# Patient Record
Sex: MALE | Race: WHITE | Employment: FULL TIME | URBAN - METROPOLITAN AREA
[De-identification: names, ages, dates, MRNs, and addresses within clinical notes are randomized per-mention and may not be internally consistent; named-entity substitution may affect disease eponyms.]

---

## 2017-03-14 ENCOUNTER — HOSPITAL ENCOUNTER (OUTPATIENT)
Dept: MAMMOGRAPHY | Age: 32
Discharge: HOME | End: 2017-03-14
Attending: NURSE PRACTITIONER
Payer: COMMERCIAL

## 2017-03-14 DIAGNOSIS — N63.0 BREAST LUMP: ICD-10-CM

## 2017-03-14 DIAGNOSIS — N64.4 BREAST PAIN: ICD-10-CM

## 2017-03-14 PROCEDURE — 76642 ULTRASOUND BREAST LIMITED: CPT

## 2017-03-14 PROCEDURE — 77066 DX MAMMO INCL CAD BI: CPT

## 2017-03-23 PROBLEM — G89.29 CHRONIC HIP PAIN: Status: ACTIVE | Noted: 2017-03-23

## 2017-03-23 PROBLEM — F32.A DEPRESSION: Status: ACTIVE | Noted: 2017-03-23

## 2017-03-23 PROBLEM — M25.559 CHRONIC HIP PAIN: Status: ACTIVE | Noted: 2017-03-23

## 2017-04-10 ENCOUNTER — HOSPITAL ENCOUNTER (OUTPATIENT)
Dept: SLEEP MEDICINE | Age: 32
Discharge: HOME | End: 2017-04-10
Payer: COMMERCIAL

## 2017-04-10 PROCEDURE — 95810 POLYSOM 6/> YRS 4/> PARAM: CPT

## 2017-05-23 PROBLEM — G47.10 HYPERSOMNOLENCE: Status: ACTIVE | Noted: 2017-05-23

## 2017-11-14 PROBLEM — J30.2 CHRONIC SEASONAL ALLERGIC RHINITIS: Status: ACTIVE | Noted: 2017-11-14

## 2017-12-21 ENCOUNTER — HOSPITAL ENCOUNTER (OUTPATIENT)
Dept: LAB | Age: 32
Discharge: HOME | End: 2017-12-21

## 2017-12-21 PROCEDURE — 88305 TISSUE EXAM BY PATHOLOGIST: CPT | Performed by: OTOLARYNGOLOGY

## 2017-12-27 PROBLEM — F33.9 RECURRENT DEPRESSION (HCC): Status: ACTIVE | Noted: 2017-12-27

## 2018-08-03 PROBLEM — E78.2 MIXED HYPERLIPIDEMIA: Status: ACTIVE | Noted: 2018-08-03

## 2018-08-03 PROBLEM — F33.9 RECURRENT DEPRESSION (HCC): Status: RESOLVED | Noted: 2017-12-27 | Resolved: 2018-08-03

## 2018-08-03 PROBLEM — M25.551 CHRONIC PAIN OF RIGHT HIP: Status: ACTIVE | Noted: 2018-08-03

## 2018-08-03 PROBLEM — G47.10 HYPERSOMNOLENCE: Status: ACTIVE | Noted: 2018-08-03

## 2018-08-03 PROBLEM — F32.A CHRONIC DEPRESSION: Status: ACTIVE | Noted: 2018-08-03

## 2018-08-03 PROBLEM — F32.A DEPRESSION: Status: RESOLVED | Noted: 2017-03-23 | Resolved: 2018-08-03

## 2018-08-03 PROBLEM — G89.29 CHRONIC HIP PAIN: Status: RESOLVED | Noted: 2017-03-23 | Resolved: 2018-08-03

## 2018-08-03 PROBLEM — M25.559 CHRONIC HIP PAIN: Status: RESOLVED | Noted: 2017-03-23 | Resolved: 2018-08-03

## 2018-08-03 PROBLEM — J30.2 CHRONIC SEASONAL ALLERGIC RHINITIS: Status: RESOLVED | Noted: 2017-11-14 | Resolved: 2018-08-03

## 2018-08-03 PROBLEM — G47.10 HYPERSOMNOLENCE: Status: RESOLVED | Noted: 2017-05-23 | Resolved: 2018-08-03

## 2018-08-03 PROBLEM — G89.29 CHRONIC PAIN OF RIGHT HIP: Status: ACTIVE | Noted: 2018-08-03

## 2019-04-24 ENCOUNTER — HOSPITAL ENCOUNTER (OUTPATIENT)
Dept: ULTRASOUND IMAGING | Age: 34
Discharge: HOME | End: 2019-04-24
Attending: INTERNAL MEDICINE
Payer: COMMERCIAL

## 2019-04-24 DIAGNOSIS — N50.89 TESTICULAR LUMP: ICD-10-CM

## 2019-04-24 PROCEDURE — 76870 US EXAM SCROTUM: CPT

## 2019-04-25 NOTE — PROGRESS NOTES
US didn't show anything specific but showed a \"nonspecific\" abnormality where he was having discomfort. Let's refer to Urology for scrotal pain.

## 2019-04-25 NOTE — PROGRESS NOTES
Spoke with patient advised per  US didn't show anything specific but showed a \"nonspecific\" abnormality where he was having discomfort. Will refer to Urology for scrotal pain. Informed him he will get an call from that office.